# Patient Record
Sex: MALE | Race: WHITE | URBAN - METROPOLITAN AREA
[De-identification: names, ages, dates, MRNs, and addresses within clinical notes are randomized per-mention and may not be internally consistent; named-entity substitution may affect disease eponyms.]

---

## 2023-01-09 ENCOUNTER — OFFICE VISIT (OUTPATIENT)
Dept: URBAN - METROPOLITAN AREA CLINIC 111 | Facility: CLINIC | Age: 68
End: 2023-01-09
Payer: COMMERCIAL

## 2023-01-09 ENCOUNTER — DASHBOARD ENCOUNTERS (OUTPATIENT)
Age: 68
End: 2023-01-09

## 2023-01-09 VITALS
DIASTOLIC BLOOD PRESSURE: 77 MMHG | HEART RATE: 76 BPM | WEIGHT: 210.8 LBS | HEIGHT: 71 IN | SYSTOLIC BLOOD PRESSURE: 118 MMHG | BODY MASS INDEX: 29.51 KG/M2

## 2023-01-09 DIAGNOSIS — K85.10 ACUTE BILIARY PANCREATITIS WITHOUT INFECTION OR NECROSIS: ICD-10-CM

## 2023-01-09 DIAGNOSIS — R10.13 EPIGASTRIC ABDOMINAL PAIN: ICD-10-CM

## 2023-01-09 DIAGNOSIS — K80.20 GALLSTONES: ICD-10-CM

## 2023-01-09 PROCEDURE — 99203 OFFICE O/P NEW LOW 30 MIN: CPT | Performed by: INTERNAL MEDICINE

## 2023-01-09 RX ORDER — LOSARTAN POTASSIUM 50 MG/1
1 TABLET TABLET ORAL ONCE A DAY
Status: ACTIVE | COMMUNITY

## 2023-01-09 RX ORDER — ATORVASTATIN CALCIUM 10 MG/1
1 TABLET TABLET, FILM COATED ORAL ONCE A DAY
Status: ACTIVE | COMMUNITY

## 2023-01-09 NOTE — HPI-ABDOMINAL PAIN
Patient presents with abdominal pain follow-up after Counts include 234 beds at the [facility] admission.  Patient was admitted for elevated lipase with acute pancreatitis thought to be possibly related to the passage of a stone.  Patient had further imaging which shows gallstones.  Otherwise, CT scan shows no biliary dilatation and labs normalized.  Patient states minimal pain in the epigastric area.  Patient had more nausea than anything else.  Patient states pain is dull ache/sharp pain.  Patient states/denies radiation of the pain.  Patient states alcohol very seldomly.  Patient denies NSAIDs.  Patient denies nausea or vomiting.  Patient denies any fever or chills.

## 2023-01-10 PROBLEM — 235919008: Status: ACTIVE | Noted: 2023-01-10
